# Patient Record
Sex: FEMALE | Race: WHITE | NOT HISPANIC OR LATINO | Employment: OTHER | URBAN - METROPOLITAN AREA
[De-identification: names, ages, dates, MRNs, and addresses within clinical notes are randomized per-mention and may not be internally consistent; named-entity substitution may affect disease eponyms.]

---

## 2024-06-04 ENCOUNTER — OFFICE VISIT (OUTPATIENT)
Dept: NEUROLOGY | Facility: CLINIC | Age: 74
End: 2024-06-04
Payer: MEDICARE

## 2024-06-04 ENCOUNTER — TELEPHONE (OUTPATIENT)
Dept: NEUROLOGY | Facility: CLINIC | Age: 74
End: 2024-06-04
Payer: MEDICARE

## 2024-06-04 VITALS
BODY MASS INDEX: 20.49 KG/M2 | SYSTOLIC BLOOD PRESSURE: 108 MMHG | HEART RATE: 73 BPM | DIASTOLIC BLOOD PRESSURE: 72 MMHG | HEIGHT: 65 IN | WEIGHT: 123 LBS

## 2024-06-04 DIAGNOSIS — G30.9 ALZHEIMER'S DISEASE: Primary | ICD-10-CM

## 2024-06-04 DIAGNOSIS — F02.80 ALZHEIMER'S DISEASE: Primary | ICD-10-CM

## 2024-06-04 PROBLEM — G30.1 PRIMARY DEGENERATIVE DEMENTIA OF THE ALZHEIMER TYPE, SENILE ONSET: Status: ACTIVE | Noted: 2019-09-04

## 2024-06-04 PROBLEM — Z98.61 CORONARY ANGIOPLASTY STATUS: Status: ACTIVE | Noted: 2022-03-24

## 2024-06-04 PROBLEM — G44.86 CERVICOGENIC HEADACHE: Status: ACTIVE | Noted: 2018-03-19

## 2024-06-04 PROBLEM — F17.210 CIGARETTE SMOKER: Status: ACTIVE | Noted: 2017-02-02

## 2024-06-04 PROBLEM — E03.9 HYPOTHYROIDISM: Status: ACTIVE | Noted: 2018-10-07

## 2024-06-04 PROBLEM — I48.91 ATRIAL FIBRILLATION: Status: ACTIVE | Noted: 2020-02-05

## 2024-06-04 PROBLEM — R93.89 ABNORMAL COMPUTERIZED AXIAL TOMOGRAPHY OF CHEST: Status: ACTIVE | Noted: 2019-04-10

## 2024-06-04 PROBLEM — I21.9 MYOCARDIAL INFARCTION: Status: ACTIVE | Noted: 2020-02-05

## 2024-06-04 PROBLEM — I25.10 CORONARY ARTERIOSCLEROSIS: Status: ACTIVE | Noted: 2017-02-23

## 2024-06-04 PROBLEM — J30.9 ALLERGIC RHINITIS: Status: ACTIVE | Noted: 2020-11-19

## 2024-06-04 PROBLEM — J44.9 CHRONIC OBSTRUCTIVE PULMONARY DISEASE: Status: ACTIVE | Noted: 2020-02-05

## 2024-06-04 PROBLEM — I10 HYPERTENSION: Status: ACTIVE | Noted: 2020-02-05

## 2024-06-04 PROBLEM — R53.83 FATIGUE: Status: ACTIVE | Noted: 2017-02-02

## 2024-06-04 PROBLEM — E78.5 HYPERLIPIDEMIA: Status: ACTIVE | Noted: 2020-02-05

## 2024-06-04 PROBLEM — Z95.0: Status: ACTIVE | Noted: 2020-01-13

## 2024-06-04 PROCEDURE — 99205 OFFICE O/P NEW HI 60 MIN: CPT | Performed by: NURSE PRACTITIONER

## 2024-06-04 PROCEDURE — 1159F MED LIST DOCD IN RCRD: CPT | Performed by: NURSE PRACTITIONER

## 2024-06-04 PROCEDURE — 3078F DIAST BP <80 MM HG: CPT | Performed by: NURSE PRACTITIONER

## 2024-06-04 PROCEDURE — 3074F SYST BP LT 130 MM HG: CPT | Performed by: NURSE PRACTITIONER

## 2024-06-04 PROCEDURE — 1160F RVW MEDS BY RX/DR IN RCRD: CPT | Performed by: NURSE PRACTITIONER

## 2024-06-04 RX ORDER — DONEPEZIL HYDROCHLORIDE 10 MG/1
1 TABLET, FILM COATED ORAL NIGHTLY
COMMUNITY
Start: 2023-10-11 | End: 2024-10-05

## 2024-06-04 RX ORDER — ISOSORBIDE MONONITRATE 30 MG/1
30 TABLET, EXTENDED RELEASE ORAL DAILY
COMMUNITY
Start: 2024-04-16

## 2024-06-04 RX ORDER — AMIODARONE HYDROCHLORIDE 200 MG/1
200 TABLET ORAL DAILY
COMMUNITY

## 2024-06-04 RX ORDER — LEVOTHYROXINE SODIUM 112 UG/1
112 TABLET ORAL DAILY
COMMUNITY

## 2024-06-04 RX ORDER — METOPROLOL SUCCINATE 25 MG/1
TABLET, EXTENDED RELEASE ORAL
COMMUNITY
Start: 2024-02-29

## 2024-06-04 RX ORDER — ATORVASTATIN CALCIUM 40 MG/1
1 TABLET, FILM COATED ORAL
COMMUNITY
Start: 2024-04-17

## 2024-06-04 RX ORDER — RIVAROXABAN 20 MG/1
1 TABLET, FILM COATED ORAL DAILY
COMMUNITY
Start: 2024-04-16

## 2024-06-04 NOTE — PROGRESS NOTES
[facility] NEUROLOGY         Date of Visit: 2024    Name: Katheryn Barcenas    :  1950    PCP: Duran Walter MD    Visit Type: an initial evaluation         Subjective     Patient ID: Katheryn is a 74 y.o. female.         History of Present Illness  I had the pleasure of seeing your patient for the first time today.  As you may know she is a 74-year-old female here today for initial evaluation for treatment of dementia syndrome.  She was a previously seen by Dr. Jose barrera with [facility] in 2017 to approximately 2020.    History:    Patient does have history of hypertension, hyperlipidemia, atrial fibrillation currently on Xarelto, hypothyroid, and previous diagnosis of Alzheimer's dementia.      Patient's daughter accompanies her to today's appointment.  They state that memory symptoms have been a concern for several years however in the last year or so have began worsening significantly.  They were initially seen by Dr. Jose mota when he was working for [place] [address] [employer].  At that time he did end up sending patient for neuropsychological testing which they believe was completed in 2018 or so.  At that time the patient was diagnosed with mild Alzheimer's dementia and was started on donepezil for management.  She has been taking 10 mg now for many years.  Patient was lost to follow-up for some time and is here today to reestablish care to discuss some worsening that they have noticed over the last 6 months or so.    They do believe a CT of the head was performed previously however we do not have records of this.  They believe neuropsychological testing was possibly performed out near [facility] [place].    Current:    Patient currently lives with her side who has moved in with her.  Son and doe currently lives with her full-time.  Daughter who presents with her today is close by however not there is a regular basis.  Patient's daughter states that the main  concern they have been noticing is increased confusion specifically with conversation that the patient does not forget full and repetitive.  They have noticed that she seems to get more agitated when they tried to explain things to her feeling that they are overstepping as she does not acknowledge that she has any memory deficits.  She is having more difficulty cooking at home although she does cook and has not burned anything she has difficulty remembering recipes that she would typically have made frequently.  She does not drive as they have discontinued driving for her at least 3 years ago.  She does not currently manage finances.  She does have someone helping manage medications.  She does have living will and power of  in place.  Her daughter is her current medical power of .    So far patient is able to perform all activities of daily living without assistance including bathing, dressing, toileting, eating.  She is able to perform a lot of household chores such as cleaning, and meal preparation.  They state that she sleeps well at nighttime.  She does sleep in a recliner and not in her bed.  In addition they state that she has fairly good appetite.  They deny frequent mood disturbances apart from when she feels that she has been corrected.  They state that for the most part she is very pleasant and happy.  She has not had any hallucinations or vivid dreams.  She currently is partial caregiver for her great-grandchildren.  She does go over to their house and watch the kids while her mother is sleeping.  They are concerned if this is an appropriate job for her at this time.  So far everything has gone well however they want to make sure this is safe and appropriate.   Aricept    Does not drive, lives with son and fiance, some trouble with cooking, smokes, loses items, repetitive questions,       The following portions of the patient's history were reviewed and updated as appropriate: allergies,  "current medications, past family history, past medical history, past social history, past surgical history, and problem list.                 Review of Systems   Constitutional:  Negative for activity change, appetite change, fatigue and unexpected weight change.   HENT:  Negative for hearing loss and trouble swallowing.    Eyes:  Negative for visual disturbance.   Respiratory:  Negative for chest tightness and shortness of breath.    Cardiovascular:  Negative for palpitations.   Gastrointestinal:  Negative for constipation, diarrhea, nausea and vomiting.   Genitourinary:  Negative for decreased urine volume, difficulty urinating and frequency.   Musculoskeletal:  Negative for gait problem.   Neurological:  Negative for tremors, syncope, facial asymmetry, speech difficulty, weakness and light-headedness.   Psychiatric/Behavioral:  Positive for confusion. Negative for agitation, behavioral problems, dysphoric mood, hallucinations and sleep disturbance. The patient is not nervous/anxious.             Current Medications:    Current Outpatient Medications   Medication Instructions    amiodarone (PACERONE) 200 mg, Oral, Daily    Aspirin (BUFFERIN LOW DOSE PO) 1 tablet, Oral, Daily    atorvastatin (LIPITOR) 40 MG tablet 1 tablet, Oral, Every Night at Bedtime    donepezil (ARICEPT) 10 MG tablet 1 tablet, Oral, Nightly    isosorbide mononitrate (IMDUR) 30 mg, Oral, Daily    levothyroxine (SYNTHROID, LEVOTHROID) 112 mcg, Oral, Daily    metoprolol succinate XL (TOPROL-XL) 25 MG 24 hr tablet TAKE 1 TABLET BY MOUTH ONCE DAILY DO NOT CRUSH OR CHEW    Xarelto 20 MG tablet 1 tablet, Oral, Daily          /72   Pulse 73   Ht 165.1 cm (65\")   Wt 55.8 kg (123 lb)   BMI 20.47 kg/m²                Objective     Neurological Exam  Mental Status  Awake and alert. Oriented only to person, place and situation. Recalls 3 of 3 objects immediately. At 15 minutes recalls 0 of 3 elements. Recalls 0 of 3 objects with prompting. Unable " to copy figure. Speech is normal. Able to name objects and read. Did not repeat symptoms appropriately, was not able to write. Unable to perform serial calculations. 0/5. MMSE score: 16.    Cranial Nerves  CN II: Visual fields full to confrontation.  CN III, IV, VI: Extraocular movements intact bilaterally. Normal lids and orbits bilaterally. Pupils equal round and reactive to light bilaterally.  CN V: Facial sensation is normal.  CN VII: Full and symmetric facial movement.  CN IX, X: Palate elevates symmetrically  CN XI: Shoulder shrug strength is normal.  CN XII: Tongue midline without atrophy or fasciculations.    Motor  Normal muscle bulk throughout. Normal muscle tone. No abnormal involuntary movements. Strength is 5/5 throughout all four extremities.    Reflexes  Deep tendon reflexes are 2+ and symmetric in all four extremities.    Coordination    Finger-to-nose, rapid alternating movements and heel-to-shin normal bilaterally without dysmetria.    Gait  Normal casual, toe, heel and tandem gait.      Physical Exam  Constitutional:       General: She is awake.      Appearance: Normal appearance. She is normal weight.   Eyes:      General: Lids are normal.      Extraocular Movements: Extraocular movements intact.      Pupils: Pupils are equal, round, and reactive to light.   Pulmonary:      Effort: Pulmonary effort is normal.   Skin:     General: Skin is warm.   Neurological:      Mental Status: She is alert.      Motor: Motor strength is normal.     Coordination: Coordination is intact.      Deep Tendon Reflexes: Reflexes are normal and symmetric.   Psychiatric:         Mood and Affect: Mood normal.         Speech: Speech normal.         Behavior: Behavior normal.                     Assessment & Plan     Diagnoses and all orders for this visit:    1. Alzheimer's disease (Primary)       At this time I do feel that patient has a fairly significant memory deficit at this time.  She scored 16 out of 30 on  Mini-Mental status examination putting her in a moderate to moderate to severe category.  Based on some of the issues patient is having at home I do feel moderate dementia is appropriate at this time.  Although patient's progression of symptoms seems slightly slower than would be expected with an Alzheimer's dementia.  I would like to obtain previous neuropsychological testing report for further clarification of diagnosis.    We did discuss that management overall would remain approximately the same at this point.  We could consider addition of memantine the patient current medication regimen.  We would probably do the extended release version as patient currently takes all of her medications once a day.  This would help with medication compliance for the patient.  They would like to discuss this as a family.    I did discuss with the family that I feel that we babysitting if someone is there with her and she is just keeping an eye on the kids is probably appropriate if this is comfort for the patient's granddaughter however I do not think the patient should be left alone with any of the children.  Children are 6 and a little older however one of the children is autistic.  I do think that she should be supervised of any kind of child watching activities at minimum.    I do agree that she should continue to stay active.  I do however agree that she should continue to live with someone and not be placed independently.    We did discuss resources such as the National Alzheimer's foundation for additional caregiving support support.  Patient still does not seem understanding of diagnosis or understanding that she has any problems with memory.  She has little insight into her disease process overall.  She should continue to be monitored closely.    Right now I do not see any need for any kind of mood stabilizing medications.  We did discuss how sometimes mood things such as anxiety and depression or even hallucinations  or agitation be from part of the process.  We can consider medication for this in the future if necessary.    For now we will plan on follow-up in 2 months if they decide to go forward with memantine.  We will plan for 3-month follow-up at minimum for continued monitoring of medication and mental status.    Total of 60 minutes was spent with patient and family discussing diagnosis, prognosis, plan of care, disease process and resources, and recommendations for lifestyle changes.              Cherise MCLEAN    Neurology    [facility] Neurology [place]    Phone: [phone]    6/4/2024 , 21:02 EDT

## 2024-06-04 NOTE — TELEPHONE ENCOUNTER
LVM with [facility] Medical Records requesting Dr. Gonzalez neurology medical records said to call with questions left fax

## 2024-06-05 ENCOUNTER — PATIENT ROUNDING (BHMG ONLY) (OUTPATIENT)
Dept: NEUROLOGY | Facility: CLINIC | Age: 74
End: 2024-06-05
Payer: MEDICARE

## 2024-06-11 DIAGNOSIS — G30.9 ALZHEIMER'S DISEASE: Primary | ICD-10-CM

## 2024-06-11 DIAGNOSIS — F02.80 ALZHEIMER'S DISEASE: Primary | ICD-10-CM

## 2024-06-11 RX ORDER — MEMANTINE HYDROCHLORIDE 5 MG/1
TABLET ORAL
Qty: 70 TABLET | Refills: 0 | Status: SHIPPED | OUTPATIENT
Start: 2024-06-11

## 2024-06-11 RX ORDER — MEMANTINE HYDROCHLORIDE 10 MG/1
10 TABLET ORAL DAILY
Qty: 30 TABLET | Refills: 2 | Status: SHIPPED | OUTPATIENT
Start: 2024-06-11 | End: 2024-06-12 | Stop reason: SDUPTHER

## 2024-06-12 ENCOUNTER — TELEPHONE (OUTPATIENT)
Dept: NEUROLOGY | Facility: CLINIC | Age: 74
End: 2024-06-12

## 2024-06-12 DIAGNOSIS — G30.9 ALZHEIMER'S DISEASE: ICD-10-CM

## 2024-06-12 DIAGNOSIS — F02.80 ALZHEIMER'S DISEASE: ICD-10-CM

## 2024-06-12 RX ORDER — MEMANTINE HYDROCHLORIDE 10 MG/1
10 TABLET ORAL 2 TIMES DAILY
Qty: 180 TABLET | Refills: 1 | Status: SHIPPED | OUTPATIENT
Start: 2024-06-12 | End: 2024-12-09

## 2024-06-12 NOTE — TELEPHONE ENCOUNTER
Sent for 10 mg BID. I accidentally sent for once daily instead of twice daily on the 10 mg. Fill 5 mg first. New 10 mg script was sent.

## 2024-06-12 NOTE — TELEPHONE ENCOUNTER
Caller: [facility] [address] [facility] - [phone]  - [phone]    Relationship: Pharmacy    What was the call regarding: MINI WITH THE PHARMACY CALLED AND NEEDS CLARIFICATION ON THE MEMANTINE MEDICATIONS. ONE WAS SENT AS 5 MG TO TAPER TO 20 MG A DAY BUT THE OTHER IS 10 MG AND ONLY LISTED TO TAKE AS 10 MG A DAY. THEY NEED TO KNOW WHICH RX SCRIPT THEY ARE FILL AND USE.     PLEASE REVIEW  THANK YOU

## 2024-09-11 ENCOUNTER — TELEPHONE (OUTPATIENT)
Dept: NEUROLOGY | Facility: CLINIC | Age: 74
End: 2024-09-11

## 2024-09-11 NOTE — TELEPHONE ENCOUNTER
Pt's daughter arrived before pt at 3:00. I advised pt must be present and could not check in and advised there is a 15 min teresita period and I would have to ask provider to be seen past 15 min teresita period. Provider advised she will not see pt past teresita period and I advised pt and pt's daughter we will need to reschedule. Daughter advised they will not be rescheduling and want to speak to provider. Advised we would put in a message as provider was with another patient.

## 2024-10-29 ENCOUNTER — OFFICE VISIT (OUTPATIENT)
Dept: NEUROLOGY | Facility: CLINIC | Age: 74
End: 2024-10-29
Payer: MEDICARE

## 2024-10-29 VITALS
HEIGHT: 65 IN | SYSTOLIC BLOOD PRESSURE: 126 MMHG | HEART RATE: 69 BPM | WEIGHT: 135 LBS | DIASTOLIC BLOOD PRESSURE: 70 MMHG | BODY MASS INDEX: 22.49 KG/M2

## 2024-10-29 DIAGNOSIS — G30.9 ALZHEIMER'S DISEASE: Primary | ICD-10-CM

## 2024-10-29 DIAGNOSIS — F02.80 ALZHEIMER'S DISEASE: Primary | ICD-10-CM

## 2024-10-29 RX ORDER — DONEPEZIL HYDROCHLORIDE 10 MG/1
10 TABLET, FILM COATED ORAL NIGHTLY
Qty: 90 TABLET | Refills: 1 | Status: SHIPPED | OUTPATIENT
Start: 2024-10-29 | End: 2025-04-27

## 2024-10-29 RX ORDER — MEMANTINE HYDROCHLORIDE 10 MG/1
10 TABLET ORAL 2 TIMES DAILY
Qty: 180 TABLET | Refills: 1 | Status: SHIPPED | OUTPATIENT
Start: 2024-10-29 | End: 2025-04-27

## 2024-10-29 NOTE — PROGRESS NOTES
[facility] NEUROLOGY         Date of Visit: 10/29/2024    Name: Katheryn Barcenas    :  1950    PCP: Pardeep Welsh DO    Visit Type: follow-up         Subjective     Patient ID: Katheryn is a 74 y.o. female.         History of Present Illness  I had the pleasure of seeing your patient today.  As you may know she is a 74-year-old female here today for follow-up for treatment of dementia syndrome.  She was a previously seen by Dr. Callejas with [facility] in 2017 to approximately 2020.    History:    Patient does have history of hypertension, hyperlipidemia, atrial fibrillation currently on Xarelto, hypothyroid, and previous diagnosis of Alzheimer's dementia.      Patient's daughter accompanies her to today's appointment.  They state that memory symptoms have been a concern for several years however in the last year or so have began worsening significantly.  They were initially seen by Dr. Jose mota when he was working for [place] [address] [employer].  At that time he did end up sending patient for neuropsychological testing which they believe was completed in 2018 or so.  At that time the patient was diagnosed with mild Alzheimer's dementia and was started on donepezil for management.  She has been taking 10 mg now for many years.  Patient was lost to follow-up for some time and is here today to reestablish care to discuss some worsening that they have noticed over the last 6 months or so.    They do believe a CT of the head was performed previously however we do not have records of this.  They believe neuropsychological testing was possibly performed out near [facility].    Current:    At last appointment we did end up adding memantine to patient's current medication regimen for treatment of dementia syndrome.  She did well on the medication and is up to the 10 mg with no side effect complaints.  Patient and daughter state that overall they feel that things are  stable.  They have not noticed any worsening memory issues.  The patient was reporting some difficulty with sleep stating that she is up frequently and then fatigued during the daytime.  She thought this was may be medicine related however reports frequent urination at nighttime.  Her daughter states that when she is stayed at her house she seems to sleep well however at her own home the patient is using the TV at nighttime and could be having some additional sleep issues.  She is going to speak with her brother about this.  They do have an upcoming appointment with her primary care next week that they can discuss urination issues with if necessary.    Patient is still maintaining all of her ability to do her day-to-day activities including medication administration, household chores.  She spends a lot of time with her neighbor and friend and they go out often to do activities together.  They report that she is eating and has good appetite however often is not making very nutritious food choices often eating chips or snack type foods.  They do report if meals are prepped for her she eats very well.  This is more often if she is getting something for herself.  Overall weight remains stable.  They deny any hallucinations or vivid dreams.  No new gait changes.  Patient is not babysitting without someone in the home.  She does still help care for her grandkids when her daughter is sleeping.  Otherwise there are no other new neurologic complaints at today's visit.      The following portions of the patient's history were reviewed and updated as appropriate: allergies, current medications, past family history, past medical history, past social history, past surgical history, and problem list.                 Review of Systems   Constitutional:  Negative for activity change, appetite change and unexpected weight change.   HENT:  Negative for hearing loss and trouble swallowing.    Eyes:  Negative for visual disturbance.  "  Respiratory:  Negative for chest tightness and shortness of breath.    Cardiovascular:  Negative for palpitations.   Gastrointestinal:  Negative for constipation and diarrhea.   Genitourinary:  Negative for decreased urine volume, difficulty urinating and frequency.   Musculoskeletal:  Negative for gait problem.   Neurological:  Negative for tremors, syncope, facial asymmetry, speech difficulty and light-headedness.   Psychiatric/Behavioral:  Positive for confusion. Negative for agitation, behavioral problems, dysphoric mood, hallucinations and sleep disturbance. The patient is not nervous/anxious.             Current Medications:    Current Outpatient Medications   Medication Instructions    amiodarone (PACERONE) 200 mg, Daily    apixaban (ELIQUIS) 5 mg, 2 Times Daily    Aspirin (BUFFERIN LOW DOSE PO) 1 tablet, Daily    atorvastatin (LIPITOR) 40 MG tablet 1 tablet, Every Night at Bedtime    donepezil (ARICEPT) 10 mg, Oral, Nightly    isosorbide mononitrate (IMDUR) 30 mg, Daily    levothyroxine (SYNTHROID, LEVOTHROID) 112 mcg, Daily    memantine (NAMENDA) 10 mg, Oral, 2 Times Daily    metoprolol succinate XL (TOPROL-XL) 25 MG 24 hr tablet TAKE 1 TABLET BY MOUTH ONCE DAILY DO NOT CRUSH OR CHEW          /70   Pulse 69   Ht 165.1 cm (65\")   Wt 61.2 kg (135 lb)   BMI 22.47 kg/m²                Objective     Neurological Exam  Mental Status  Awake and alert. Oriented only to person, place and situation. Recalls 3 of 3 objects immediately. At 15 minutes recalls 0 of 3 elements. Recalls 0 of 3 objects with prompting. Unable to copy figure. Speech is normal. Able to name objects and read. Did not repeat symptoms appropriately, was not able to write. Unable to perform serial calculations. 0/5. MMSE score: 16.    Cranial Nerves  CN II: Visual fields full to confrontation.  CN III, IV, VI: Extraocular movements intact bilaterally. Normal lids and orbits bilaterally. Pupils equal round and reactive to light " bilaterally.  CN V: Facial sensation is normal.  CN VII: Full and symmetric facial movement.  CN IX, X: Palate elevates symmetrically  CN XI: Shoulder shrug strength is normal.  CN XII: Tongue midline without atrophy or fasciculations.    Motor  Normal muscle bulk throughout. Normal muscle tone. No abnormal involuntary movements. Strength is 5/5 throughout all four extremities.    Reflexes  Deep tendon reflexes are 2+ and symmetric in all four extremities.    Coordination    Finger-to-nose, rapid alternating movements and heel-to-shin normal bilaterally without dysmetria.    Gait  Normal casual, toe, heel and tandem gait.      Physical Exam  Constitutional:       General: She is awake.      Appearance: Normal appearance. She is normal weight.   Eyes:      General: Lids are normal.      Extraocular Movements: Extraocular movements intact.      Pupils: Pupils are equal, round, and reactive to light.   Pulmonary:      Effort: Pulmonary effort is normal.   Skin:     General: Skin is warm.   Neurological:      Mental Status: She is alert.      Motor: Motor strength is normal.     Coordination: Coordination is intact.      Deep Tendon Reflexes: Reflexes are normal and symmetric.   Psychiatric:         Mood and Affect: Mood normal.         Speech: Speech normal.         Behavior: Behavior normal.                     Assessment & Plan     Diagnoses and all orders for this visit:    1. Alzheimer's disease (Primary)  -     memantine (NAMENDA) 10 MG tablet; Take 1 tablet by mouth 2 (Two) Times a Day for 180 days.  Dispense: 180 tablet; Refill: 1    Other orders  -     donepezil (ARICEPT) 10 MG tablet; Take 1 tablet by mouth Every Night for 180 days.  Dispense: 90 tablet; Refill: 1      At this time I would like to go ahead and continue donepezil and memantine as prescribed.    Overall patient seems to be doing well.  No additional needs were requested at this appointment.    Follow-up in 6 months or sooner if needed.                 Cherise MCLEAN    Neurology    [facility] Neurology [place]    Phone: [phone]    10/29/2024 , 15:51 EDT

## 2025-04-23 ENCOUNTER — OFFICE VISIT (OUTPATIENT)
Dept: NEUROLOGY | Facility: CLINIC | Age: 75
End: 2025-04-23
Payer: COMMERCIAL

## 2025-04-23 VITALS
HEART RATE: 78 BPM | SYSTOLIC BLOOD PRESSURE: 132 MMHG | DIASTOLIC BLOOD PRESSURE: 84 MMHG | WEIGHT: 133 LBS | HEIGHT: 65 IN | BODY MASS INDEX: 22.16 KG/M2

## 2025-04-23 DIAGNOSIS — G30.9 ALZHEIMER'S DISEASE: Primary | ICD-10-CM

## 2025-04-23 DIAGNOSIS — F02.80 ALZHEIMER'S DISEASE: Primary | ICD-10-CM

## 2025-04-23 PROCEDURE — 1160F RVW MEDS BY RX/DR IN RCRD: CPT | Performed by: NURSE PRACTITIONER

## 2025-04-23 PROCEDURE — 1159F MED LIST DOCD IN RCRD: CPT | Performed by: NURSE PRACTITIONER

## 2025-04-23 PROCEDURE — 3075F SYST BP GE 130 - 139MM HG: CPT | Performed by: NURSE PRACTITIONER

## 2025-04-23 PROCEDURE — 99214 OFFICE O/P EST MOD 30 MIN: CPT | Performed by: NURSE PRACTITIONER

## 2025-04-23 PROCEDURE — 3079F DIAST BP 80-89 MM HG: CPT | Performed by: NURSE PRACTITIONER

## 2025-04-23 RX ORDER — DONEPEZIL HYDROCHLORIDE 10 MG/1
10 TABLET, FILM COATED ORAL NIGHTLY
Qty: 90 TABLET | Refills: 1 | Status: SHIPPED | OUTPATIENT
Start: 2025-04-23 | End: 2025-10-20

## 2025-04-23 RX ORDER — MEMANTINE HYDROCHLORIDE 10 MG/1
10 TABLET ORAL 2 TIMES DAILY
Qty: 180 TABLET | Refills: 1 | Status: SHIPPED | OUTPATIENT
Start: 2025-04-23 | End: 2025-10-20

## 2025-04-23 NOTE — PROGRESS NOTES
[facility] NEUROLOGY         Date of Visit: 2025    Name: Katheryn Barcenas    :  1950    PCP: Pardeep Welsh DO    Visit Type: follow-up         Subjective     Patient ID: Katheryn is a 75 y.o. female.         History of Present Illness  I had the pleasure of seeing your patient today.  As you may know she is a 75-year-old female here today for follow-up for treatment of dementia syndrome.  She was a previously seen by Dr. Callejas with [facility] in 2017 to approximately .    History:    Patient does have history of hypertension, hyperlipidemia, atrial fibrillation currently on Xarelto, hypothyroid, and previous diagnosis of Alzheimer's dementia.      Patient's daughter accompanies her to today's appointment.  They state that memory symptoms have been a concern for several years however in the last year or so have began worsening significantly.  They were initially seen by Dr. Júnior mota when he was working for [employer].  At that time he did end up sending patient for neuropsychological testing which they believe was completed in 2018 or so.  At that time the patient was diagnosed with mild Alzheimer's dementia and was started on donepezil for management.  She has been taking 10 mg now for many years.  Patient was lost to follow-up for some time and is here today to reestablish care to discuss some worsening that they have noticed over the last 6 months or so.    They do believe a CT of the head was performed previously however we do not have records of this.  They believe neuropsychological testing was possibly performed out near [facility].    Patient does currently take donepezil and memantine for treatment of memory loss.    Current:    Patient is accompanied again by her daughter who is her primary caregiver.  They state that overall the patient has been doing fairly well however is having continued decline in memory.  She still  does help out with some care for her grandchildren however is never alone.  She also works out working with the animals they have at the house.  She stays very social typically going on walks with a friend or going to her house for dinner.  They report that she does have some sundowning where her confusion seems a little worse a couple of times a week in the evening.  They deny any agitation.  No concerns for any gait changes.  No hallucinations or vivid dreams.  They report that she has a good appetite however does not always make healthy choices for her food.  She continues to smoke despite encouragement from both family and physicians to discontinue smoking.  They do report some sleep disturbance reporting that she is up several times a night to use the restroom.  Patient does report drinking a lot of fluids and she feels this is the cause of this.  She has never been seen by a urologist for any kind of bladder issues.  They deny any other new or worsening neurologic symptoms at today's visit.      The following portions of the patient's history were reviewed and updated as appropriate: allergies, current medications, past family history, past medical history, past social history, past surgical history, and problem list.                 Review of Systems   Constitutional:  Negative for activity change, appetite change and unexpected weight change.   HENT:  Negative for hearing loss and trouble swallowing.    Eyes:  Negative for visual disturbance.   Respiratory:  Negative for chest tightness and shortness of breath.    Cardiovascular:  Negative for palpitations.   Gastrointestinal:  Negative for constipation and diarrhea.   Genitourinary:  Negative for decreased urine volume, difficulty urinating and frequency.   Musculoskeletal:  Negative for gait problem.   Neurological:  Negative for tremors, syncope, facial asymmetry, speech difficulty and light-headedness.   Psychiatric/Behavioral:  Positive for confusion.  "Negative for agitation, behavioral problems, dysphoric mood, hallucinations and sleep disturbance. The patient is not nervous/anxious.             Current Medications:    Current Outpatient Medications   Medication Instructions    amiodarone (PACERONE) 200 mg, Daily    apixaban (ELIQUIS) 5 mg, 2 Times Daily    Aspirin (BUFFERIN LOW DOSE PO) 1 tablet, Daily    atorvastatin (LIPITOR) 40 MG tablet 1 tablet, Every Night at Bedtime    donepezil (ARICEPT) 10 mg, Oral, Nightly    isosorbide mononitrate (IMDUR) 30 mg, Daily    levothyroxine (SYNTHROID, LEVOTHROID) 112 mcg, Daily    memantine (NAMENDA) 10 mg, Oral, 2 Times Daily    metoprolol succinate XL (TOPROL-XL) 25 MG 24 hr tablet TAKE 1 TABLET BY MOUTH ONCE DAILY DO NOT CRUSH OR CHEW          /84   Pulse 78   Ht 165.1 cm (65\")   Wt 60.3 kg (133 lb)   BMI 22.13 kg/m²                Objective     Neurological Exam  Mental Status  Awake and alert. Oriented only to person, place and situation. Speech is normal.    Cranial Nerves  CN II: Visual fields full to confrontation.  CN III, IV, VI: Extraocular movements intact bilaterally. Normal lids and orbits bilaterally. Pupils equal round and reactive to light bilaterally.  CN V: Facial sensation is normal.  CN VII: Full and symmetric facial movement.  CN IX, X: Palate elevates symmetrically  CN XI: Shoulder shrug strength is normal.  CN XII: Tongue midline without atrophy or fasciculations.    Motor  Normal muscle bulk throughout. Normal muscle tone. No abnormal involuntary movements. Strength is 5/5 throughout all four extremities.    Reflexes  Deep tendon reflexes are 2+ and symmetric in all four extremities.    Coordination    Finger-to-nose, rapid alternating movements and heel-to-shin normal bilaterally without dysmetria.    Gait  Normal casual, toe, heel and tandem gait.      Physical Exam  Constitutional:       General: She is awake.      Appearance: Normal appearance. She is normal weight.   Eyes:      " General: Lids are normal.      Extraocular Movements: Extraocular movements intact.      Pupils: Pupils are equal, round, and reactive to light.   Pulmonary:      Effort: Pulmonary effort is normal.   Skin:     General: Skin is warm.   Neurological:      Mental Status: She is alert.      Motor: Motor strength is normal.     Coordination: Coordination is intact.      Deep Tendon Reflexes: Reflexes are normal and symmetric.   Psychiatric:         Mood and Affect: Mood normal.         Speech: Speech normal.         Behavior: Behavior normal.                     Assessment & Plan     Diagnoses and all orders for this visit:    1. Alzheimer's disease (Primary)  -     donepezil (ARICEPT) 10 MG tablet; Take 1 tablet by mouth Every Night for 180 days.  Dispense: 90 tablet; Refill: 1  -     memantine (NAMENDA) 10 MG tablet; Take 1 tablet by mouth 2 (Two) Times a Day for 180 days.  Dispense: 180 tablet; Refill: 1        At this time I would like to go ahead and continue donepezil and memantine as prescribed.    Overall patient seems to be doing well.  No additional needs were requested at this appointment.    Follow-up in 6 months or sooner if needed.                Cherise MCLEAN    Neurology    [facility] [place]    Phone: [phone]    4/23/2025 , 16:30 EDT